# Patient Record
Sex: FEMALE | Race: WHITE | NOT HISPANIC OR LATINO | Employment: OTHER | URBAN - METROPOLITAN AREA
[De-identification: names, ages, dates, MRNs, and addresses within clinical notes are randomized per-mention and may not be internally consistent; named-entity substitution may affect disease eponyms.]

---

## 2017-03-01 NOTE — PATIENT DISCUSSION
AMD (DRY), OU  PRESCRIBE AREDS 2 VITAMINS / AMSLER GRID QD/ UV PROTECTION. SMOKING CESSATION EMPHASIZED. RETURN FOR FOLLOW-UP AS SCHEDULED.

## 2017-09-01 NOTE — PATIENT DISCUSSION
AMD (DRY), OU:  CONTINUE AREDS 2 VITAMINS / AMSLER GRID QD/ UV PROTECTION. SMOKING CESSATION EMPHASIZED. RETURN FOR FOLLOW-UP AS SCHEDULED.

## 2018-08-23 ENCOUNTER — ESTABLISHED COMPREHENSIVE EXAM (OUTPATIENT)
Dept: URBAN - METROPOLITAN AREA CLINIC 46 | Facility: CLINIC | Age: 48
End: 2018-08-23

## 2018-08-23 DIAGNOSIS — H25.9: ICD-10-CM

## 2018-08-23 DIAGNOSIS — H52.7: ICD-10-CM

## 2018-08-23 DIAGNOSIS — H18.51: ICD-10-CM

## 2018-08-23 PROCEDURE — 92015 DETERMINE REFRACTIVE STATE: CPT

## 2018-08-23 PROCEDURE — 92014 COMPRE OPH EXAM EST PT 1/>: CPT

## 2018-08-23 ASSESSMENT — VISUAL ACUITY
OD_SC: 20/40-2
OS_CC: 20/40+2
OS_CC: J3
OS_SC: 20/40
OS_SC: J8
OD_CC: J6
OD_CC: 20/30-2
OD_SC: J8

## 2018-08-23 ASSESSMENT — TONOMETRY
OS_IOP_MMHG: 13
OD_IOP_MMHG: 13

## 2018-10-05 NOTE — PATIENT DISCUSSION
EYELID PTOSIS, OU:  NO DIPLOPIA, ANISOCORIA OR FATIGUING SYMPTOMS. NOT VISUALLY SIGNIFICANT TO PATIENT.  WILL CONTINUE TO FOLLOW

## 2018-10-05 NOTE — PATIENT DISCUSSION
Eyelid Ptosis Counseling: I have discussed my exam findings and the nature of the diagnosis to the patient. There is no evidence of Magno syndrome, CN III palsy, eyelid malignancy, or myasthenia gravis. Treatment options include observation, taping the lids up, eyelid crutches attached to glasses, or surgical repair. The patient is instructed to return for follow-up as scheduled or sooner if any change in symptoms.

## 2019-01-21 ENCOUNTER — CORNEA CONSULT (OUTPATIENT)
Dept: URBAN - METROPOLITAN AREA CLINIC 43 | Facility: CLINIC | Age: 49
End: 2019-01-21

## 2019-01-21 VITALS
RESPIRATION RATE: 14 BRPM | HEIGHT: 55 IN | HEART RATE: 58 BPM | SYSTOLIC BLOOD PRESSURE: 129 MMHG | DIASTOLIC BLOOD PRESSURE: 79 MMHG

## 2019-01-21 DIAGNOSIS — H25.812: ICD-10-CM

## 2019-01-21 DIAGNOSIS — H18.51: ICD-10-CM

## 2019-01-21 DIAGNOSIS — H25.811: ICD-10-CM

## 2019-01-21 PROCEDURE — 92286 ANT SGM IMG I&R SPECLR MIC: CPT

## 2019-01-21 PROCEDURE — 92014 COMPRE OPH EXAM EST PT 1/>: CPT

## 2019-01-21 PROCEDURE — 92134 CPTRZ OPH DX IMG PST SGM RTA: CPT

## 2019-01-21 RX ORDER — PREDNISOLONE ACETATE 10 MG/ML: 1 SUSPENSION/ DROPS OPHTHALMIC

## 2019-01-21 RX ORDER — MOXIFLOXACIN HYDROCHLORIDE 5 MG/ML: 1 SOLUTION/ DROPS OPHTHALMIC

## 2019-01-21 RX ORDER — NEPAFENAC 3 MG/ML: 1 SUSPENSION/ DROPS OPHTHALMIC ONCE A DAY

## 2019-01-21 ASSESSMENT — VISUAL ACUITY
OD_CC: J6
OS_SC: 20/40-2
OS_CC: J5
OD_SC: J10
OS_CC: 20/40-2
OD_CC: 20/40-1
OS_SC: J12
OS_GLARE: 20/70
OD_SC: 20/50-1
OD_GLARE: 20/70

## 2019-01-21 ASSESSMENT — TONOMETRY
OD_IOP_MMHG: 13
OS_IOP_MMHG: 13

## 2019-03-29 NOTE — PATIENT DISCUSSION
AMD (DRY), ADVANCED OU:  CONTINUE PRESERVISION AREDS 2 VITAMINS / AMSLER GRID QD/ UV PROTECTION. SMOKING CESSATION EMPHASIZED. RETURN FOR FOLLOW-UP AS SCHEDULED.

## 2019-05-22 ENCOUNTER — SURGERY/PROCEDURE (OUTPATIENT)
Dept: URBAN - METROPOLITAN AREA CLINIC 43 | Facility: CLINIC | Age: 49
End: 2019-05-22

## 2019-05-22 ENCOUNTER — PRE-OP/H&P (OUTPATIENT)
Dept: URBAN - METROPOLITAN AREA SURGERY 14 | Facility: SURGERY | Age: 49
End: 2019-05-22

## 2019-05-22 DIAGNOSIS — H25.811: ICD-10-CM

## 2019-05-22 DIAGNOSIS — H18.51: ICD-10-CM

## 2019-05-22 DIAGNOSIS — H25.812: ICD-10-CM

## 2019-05-22 PROCEDURE — 65756 CORNEAL TRNSPL ENDOTHELIAL: CPT

## 2019-05-22 PROCEDURE — 99499 UNLISTED E&M SERVICE: CPT

## 2019-05-22 PROCEDURE — 66984 XCAPSL CTRC RMVL W/O ECP: CPT

## 2019-05-23 ENCOUNTER — 1 DAY POST-OP (OUTPATIENT)
Dept: URBAN - METROPOLITAN AREA CLINIC 46 | Facility: CLINIC | Age: 49
End: 2019-05-23

## 2019-05-23 DIAGNOSIS — Z96.1: ICD-10-CM

## 2019-05-23 DIAGNOSIS — Z98.890: ICD-10-CM

## 2019-05-23 PROCEDURE — 99024 POSTOP FOLLOW-UP VISIT: CPT

## 2019-05-23 ASSESSMENT — TONOMETRY: OD_IOP_MMHG: 18

## 2019-05-23 ASSESSMENT — VISUAL ACUITY: OD_SC: 20/200-1

## 2019-05-29 ENCOUNTER — POST-OP (OUTPATIENT)
Dept: URBAN - METROPOLITAN AREA CLINIC 46 | Facility: CLINIC | Age: 49
End: 2019-05-29

## 2019-05-29 DIAGNOSIS — Z96.1: ICD-10-CM

## 2019-05-29 DIAGNOSIS — Z98.890: ICD-10-CM

## 2019-05-29 PROCEDURE — 99024 POSTOP FOLLOW-UP VISIT: CPT

## 2019-05-29 ASSESSMENT — TONOMETRY: OD_IOP_MMHG: 10

## 2019-05-29 ASSESSMENT — VISUAL ACUITY
OS_SC: 20/40-2
OD_SC: 20/40-1

## 2019-06-03 ENCOUNTER — POST-OP (OUTPATIENT)
Dept: URBAN - METROPOLITAN AREA CLINIC 43 | Facility: CLINIC | Age: 49
End: 2019-06-03

## 2019-06-03 DIAGNOSIS — Z96.1: ICD-10-CM

## 2019-06-03 DIAGNOSIS — Z98.890: ICD-10-CM

## 2019-06-03 PROCEDURE — 99024 POSTOP FOLLOW-UP VISIT: CPT

## 2019-06-03 RX ORDER — NEPAFENAC 3 MG/ML: 1 SUSPENSION/ DROPS OPHTHALMIC ONCE A DAY

## 2019-06-03 RX ORDER — MOXIFLOXACIN HYDROCHLORIDE 5 MG/ML: 1 SOLUTION/ DROPS OPHTHALMIC

## 2019-06-03 RX ORDER — PREDNISOLONE ACETATE 10 MG/ML: 1 SUSPENSION/ DROPS OPHTHALMIC

## 2019-06-03 ASSESSMENT — TONOMETRY
OD_IOP_MMHG: 18
OS_IOP_MMHG: 19

## 2019-06-03 ASSESSMENT — VISUAL ACUITY
OS_SC: 20/40+2
OD_SC: 20/40+2

## 2019-07-08 ENCOUNTER — POST-OP (OUTPATIENT)
Dept: URBAN - METROPOLITAN AREA CLINIC 46 | Facility: CLINIC | Age: 49
End: 2019-07-08

## 2019-07-08 DIAGNOSIS — Z96.1: ICD-10-CM

## 2019-07-08 PROCEDURE — 99024 POSTOP FOLLOW-UP VISIT: CPT

## 2019-07-08 RX ORDER — FLUOROMETHOLONE 1 MG/ML: 1 SUSPENSION/ DROPS OPHTHALMIC

## 2019-07-08 RX ORDER — PREDNISOLONE ACETATE 10 MG/ML: 1 SUSPENSION/ DROPS OPHTHALMIC TWICE A DAY

## 2019-07-08 ASSESSMENT — TONOMETRY: OD_IOP_MMHG: 11

## 2019-07-08 ASSESSMENT — VISUAL ACUITY
OD_PH: 20/25
OS_SC: 20/40+1
OD_SC: 20/40-2

## 2019-08-14 ENCOUNTER — POST-OP (OUTPATIENT)
Dept: URBAN - METROPOLITAN AREA CLINIC 46 | Facility: CLINIC | Age: 49
End: 2019-08-14

## 2019-08-14 DIAGNOSIS — Z96.1: ICD-10-CM

## 2019-08-14 PROCEDURE — 99024 POSTOP FOLLOW-UP VISIT: CPT

## 2019-08-14 ASSESSMENT — VISUAL ACUITY
OS_SC: 20/30-2
OD_SC: 20/50
OD_PH: 20/40-2

## 2019-08-14 ASSESSMENT — TONOMETRY
OS_IOP_MMHG: 11
OD_IOP_MMHG: 12

## 2019-08-21 ENCOUNTER — PRE-OP/H&P (OUTPATIENT)
Dept: URBAN - METROPOLITAN AREA SURGERY 14 | Facility: SURGERY | Age: 49
End: 2019-08-21

## 2019-08-21 ENCOUNTER — SURGERY/PROCEDURE (OUTPATIENT)
Dept: URBAN - METROPOLITAN AREA CLINIC 43 | Facility: CLINIC | Age: 49
End: 2019-08-21

## 2019-08-21 DIAGNOSIS — H18.51: ICD-10-CM

## 2019-08-21 DIAGNOSIS — Z96.1: ICD-10-CM

## 2019-08-21 DIAGNOSIS — H25.812: ICD-10-CM

## 2019-08-21 PROCEDURE — 66984 XCAPSL CTRC RMVL W/O ECP: CPT

## 2019-08-21 PROCEDURE — 65756 CORNEAL TRNSPL ENDOTHELIAL: CPT

## 2019-08-21 PROCEDURE — 99499 UNLISTED E&M SERVICE: CPT

## 2019-08-21 PROCEDURE — 66500 INCISION OF IRIS: CPT

## 2019-08-22 ENCOUNTER — 1 DAY POST-OP (OUTPATIENT)
Dept: URBAN - METROPOLITAN AREA CLINIC 46 | Facility: CLINIC | Age: 49
End: 2019-08-22

## 2019-08-22 DIAGNOSIS — Z96.1: ICD-10-CM

## 2019-08-22 DIAGNOSIS — Z98.890: ICD-10-CM

## 2019-08-22 PROCEDURE — 99024 POSTOP FOLLOW-UP VISIT: CPT

## 2019-08-22 ASSESSMENT — TONOMETRY
OD_IOP_MMHG: 14
OS_IOP_MMHG: 16

## 2019-08-22 ASSESSMENT — VISUAL ACUITY
OS_SC: 20/200
OD_SC: 20/30-1

## 2019-08-26 ENCOUNTER — POST-OP CATARACT (OUTPATIENT)
Dept: URBAN - METROPOLITAN AREA CLINIC 46 | Facility: CLINIC | Age: 49
End: 2019-08-26

## 2019-08-26 DIAGNOSIS — Z96.1: ICD-10-CM

## 2019-08-26 DIAGNOSIS — Z98.890: ICD-10-CM

## 2019-08-26 PROCEDURE — 99024 POSTOP FOLLOW-UP VISIT: CPT

## 2019-08-26 ASSESSMENT — VISUAL ACUITY
OS_PH: 20/60-2
OD_SC: 20/30-1
OS_SC: 20/200

## 2019-08-26 ASSESSMENT — TONOMETRY
OS_IOP_MMHG: 20
OD_IOP_MMHG: 14

## 2019-08-30 ENCOUNTER — POST-OP CATARACT (OUTPATIENT)
Dept: URBAN - METROPOLITAN AREA CLINIC 46 | Facility: CLINIC | Age: 49
End: 2019-08-30

## 2019-08-30 DIAGNOSIS — Z96.1: ICD-10-CM

## 2019-08-30 PROCEDURE — 99024 POSTOP FOLLOW-UP VISIT: CPT

## 2019-08-30 RX ORDER — BRIMONIDINE TARTRATE, TIMOLOL MALEATE 2; 5 MG/ML; MG/ML: 1 SOLUTION/ DROPS OPHTHALMIC EVERY MORNING

## 2019-08-30 ASSESSMENT — VISUAL ACUITY
OD_SC: 20/30
OS_PH: 20/60+2
OS_SC: 20/400

## 2019-08-30 ASSESSMENT — TONOMETRY
OD_IOP_MMHG: 17
OS_IOP_MMHG: 22

## 2019-09-09 ENCOUNTER — POST-OP CATARACT (OUTPATIENT)
Dept: URBAN - METROPOLITAN AREA CLINIC 43 | Facility: CLINIC | Age: 49
End: 2019-09-09

## 2019-09-09 DIAGNOSIS — Z96.1: ICD-10-CM

## 2019-09-09 DIAGNOSIS — H18.51: ICD-10-CM

## 2019-09-09 PROCEDURE — 92025NC COMP. CORNEAL TOPO, UNI OR BILAT,

## 2019-09-09 PROCEDURE — 99024 POSTOP FOLLOW-UP VISIT: CPT

## 2019-09-09 ASSESSMENT — VISUAL ACUITY
OS_SC: 20/100-1
OD_SC: 20/30-1
OS_PH: 20/50-2

## 2019-09-09 ASSESSMENT — TONOMETRY
OD_IOP_MMHG: 10
OS_IOP_MMHG: 12

## 2019-09-11 ENCOUNTER — SURGERY/PROCEDURE (OUTPATIENT)
Dept: URBAN - METROPOLITAN AREA CLINIC 43 | Facility: CLINIC | Age: 49
End: 2019-09-11

## 2019-09-11 ENCOUNTER — PRE-OP/H&P (OUTPATIENT)
Dept: URBAN - METROPOLITAN AREA SURGERY 14 | Facility: SURGERY | Age: 49
End: 2019-09-11

## 2019-09-11 DIAGNOSIS — Z96.1: ICD-10-CM

## 2019-09-11 DIAGNOSIS — T85.398D: ICD-10-CM

## 2019-09-11 PROCEDURE — 66020 INJECTION TREATMENT OF EYE: CPT

## 2019-09-11 PROCEDURE — 66250 FOLLOW-UP SURGERY OF EYE: CPT

## 2019-09-11 PROCEDURE — 99499 UNLISTED E&M SERVICE: CPT

## 2019-09-12 ENCOUNTER — POST-OP (OUTPATIENT)
Dept: URBAN - METROPOLITAN AREA CLINIC 39 | Facility: CLINIC | Age: 49
End: 2019-09-12

## 2019-09-12 ENCOUNTER — POST-OP (OUTPATIENT)
Dept: URBAN - METROPOLITAN AREA CLINIC 46 | Facility: CLINIC | Age: 49
End: 2019-09-12

## 2019-09-12 DIAGNOSIS — Z98.890: ICD-10-CM

## 2019-09-12 DIAGNOSIS — Z96.1: ICD-10-CM

## 2019-09-12 DIAGNOSIS — T85.398D: ICD-10-CM

## 2019-09-12 PROCEDURE — 66999PO NON CO-MANAGED OTHER SURGERY PO

## 2019-09-12 PROCEDURE — 99024 POSTOP FOLLOW-UP VISIT: CPT

## 2019-09-12 ASSESSMENT — TONOMETRY
OS_IOP_MMHG: 41
OS_IOP_MMHG: 39
OS_IOP_MMHG: 37

## 2019-09-12 ASSESSMENT — VISUAL ACUITY: OS_SC: CF 1FT

## 2019-09-13 ENCOUNTER — POST-OP (OUTPATIENT)
Dept: URBAN - METROPOLITAN AREA CLINIC 46 | Facility: CLINIC | Age: 49
End: 2019-09-13

## 2019-09-13 DIAGNOSIS — T85.398D: ICD-10-CM

## 2019-09-13 DIAGNOSIS — Z98.890: ICD-10-CM

## 2019-09-13 PROCEDURE — 99024 POSTOP FOLLOW-UP VISIT: CPT

## 2019-09-13 RX ORDER — MOXIFLOXACIN OPHTHALMIC 5 MG/ML: 1 SOLUTION/ DROPS OPHTHALMIC

## 2019-09-13 ASSESSMENT — TONOMETRY: OS_IOP_MMHG: 13

## 2019-09-13 ASSESSMENT — VISUAL ACUITY: OD_SC: 20/25

## 2019-09-17 ENCOUNTER — POST-OP (OUTPATIENT)
Dept: URBAN - METROPOLITAN AREA CLINIC 39 | Facility: CLINIC | Age: 49
End: 2019-09-17

## 2019-09-17 DIAGNOSIS — T85.398D: ICD-10-CM

## 2019-09-17 DIAGNOSIS — Z98.890: ICD-10-CM

## 2019-09-17 PROCEDURE — 99024 POSTOP FOLLOW-UP VISIT: CPT

## 2019-09-17 ASSESSMENT — VISUAL ACUITY
OD_SC: 20/25-2
OS_SC: 20/30-2

## 2019-09-17 ASSESSMENT — TONOMETRY
OS_IOP_MMHG: 14
OD_IOP_MMHG: 12

## 2019-10-04 ENCOUNTER — POST-OP (OUTPATIENT)
Dept: URBAN - METROPOLITAN AREA CLINIC 46 | Facility: CLINIC | Age: 49
End: 2019-10-04

## 2019-10-04 DIAGNOSIS — T85.398D: ICD-10-CM

## 2019-10-04 DIAGNOSIS — Z98.890: ICD-10-CM

## 2019-10-04 PROCEDURE — 99024 POSTOP FOLLOW-UP VISIT: CPT

## 2019-10-04 ASSESSMENT — TONOMETRY
OD_IOP_MMHG: 13
OS_IOP_MMHG: 16

## 2019-10-04 ASSESSMENT — VISUAL ACUITY: OS_SC: 20/25-1

## 2020-05-11 ENCOUNTER — ESTABLISHED COMPREHENSIVE EXAM (OUTPATIENT)
Dept: URBAN - METROPOLITAN AREA CLINIC 46 | Facility: CLINIC | Age: 50
End: 2020-05-11

## 2020-05-11 DIAGNOSIS — T85.398D: ICD-10-CM

## 2020-05-11 DIAGNOSIS — Z96.1: ICD-10-CM

## 2020-05-11 DIAGNOSIS — Z98.890: ICD-10-CM

## 2020-05-11 PROCEDURE — 92015 DETERMINE REFRACTIVE STATE: CPT

## 2020-05-11 PROCEDURE — 92014 COMPRE OPH EXAM EST PT 1/>: CPT

## 2020-05-11 ASSESSMENT — TONOMETRY
OD_IOP_MMHG: 14
OS_IOP_MMHG: 16

## 2020-05-11 ASSESSMENT — VISUAL ACUITY
OS_SC: 20/25-1
OD_SC: 20/25+3
OD_CC: J1
OS_CC: J1
OU_SC: 20/20-1
OU_CC: J1

## 2020-10-29 ENCOUNTER — EST. PATIENT EMERGENCY (OUTPATIENT)
Dept: URBAN - METROPOLITAN AREA CLINIC 39 | Facility: CLINIC | Age: 50
End: 2020-10-29

## 2020-10-29 DIAGNOSIS — H04.123: ICD-10-CM

## 2020-10-29 PROCEDURE — 92012 INTRM OPH EXAM EST PATIENT: CPT

## 2020-10-29 ASSESSMENT — VISUAL ACUITY
OD_CC: 20/30-1
OU_CC: 20/20-1
OS_CC: 20/25

## 2020-10-29 ASSESSMENT — TONOMETRY
OD_IOP_MMHG: 14
OS_IOP_MMHG: 14

## 2021-01-18 ENCOUNTER — ESTABLISHED PATIENT (OUTPATIENT)
Dept: URBAN - METROPOLITAN AREA CLINIC 39 | Facility: CLINIC | Age: 51
End: 2021-01-18

## 2021-01-18 DIAGNOSIS — H00.13: ICD-10-CM

## 2021-01-18 DIAGNOSIS — H00.021: ICD-10-CM

## 2021-01-18 DIAGNOSIS — D23.112: ICD-10-CM

## 2021-01-18 DIAGNOSIS — D23.122: ICD-10-CM

## 2021-01-18 PROCEDURE — 99212 OFFICE O/P EST SF 10 MIN: CPT

## 2021-01-18 PROCEDURE — 92285 EXTERNAL OCULAR PHOTOGRAPHY: CPT

## 2021-01-18 ASSESSMENT — VISUAL ACUITY
OS_CC: 20/20
OD_CC: 20/20-1

## 2021-02-04 ENCOUNTER — FOLLOW UP (OUTPATIENT)
Dept: URBAN - METROPOLITAN AREA CLINIC 39 | Facility: CLINIC | Age: 51
End: 2021-02-04

## 2021-02-04 DIAGNOSIS — D23.122: ICD-10-CM

## 2021-02-04 DIAGNOSIS — D23.112: ICD-10-CM

## 2021-02-04 PROCEDURE — 99212 OFFICE O/P EST SF 10 MIN: CPT

## 2021-02-04 PROCEDURE — 67840 REMOVE EYELID LESION: CPT

## 2021-02-04 PROCEDURE — 92285 EXTERNAL OCULAR PHOTOGRAPHY: CPT

## 2021-02-04 ASSESSMENT — VISUAL ACUITY
OD_CC: 20/20-1
OS_CC: 20/20

## 2021-04-09 NOTE — PATIENT DISCUSSION
DRY EYE WITH INFLAMMATION:  PRESERVATIVE FREE ARTIFICIAL TEARS Q2 HOURS, LID HYGIENE. CONSIDER PUNCTAL PLUGS AND/OR RESTASIS AT FOLLOW. RETURN SOONER IF SYMPTOMS WORSEN.

## 2021-04-09 NOTE — PATIENT DISCUSSION
AMD (DRY), OU:  PRESCRIBE AREDS 2 VITAMINS / AMSLER GRID QD/ UV PROTECTION. SMOKING CESSATION EMPHASIZED. RETURN FOR FOLLOW-UP AS SCHEDULED.
COUNSELING:.
DIABETES WITHOUT RETINOPATHY: RETURN FOR FOLLOW-UP AS SCHEDULED FOR DILATED EXAM.
DRY EYE WITH INFLAMMATION:  PRESERVATIVE FREE ARTIFICIAL TEARS Q2 HOURS, LID HYGIENE. CONSIDER PUNCTAL PLUGS AND/OR RESTASIS AT FOLLOW. RETURN SOONER IF SYMPTOMS WORSEN.
Non Proliferative Diabetic  Counseling:  I have discussed with the patient the importance of controlling blood glucose, blood pressure and lipid levels to minimize the risk of progressing retinal complications from diabetes. I explained the importance of annual dilated eye exams because effective treatment of retinal complications depends on timely intervention. The patient was instructed to call or return sooner if they noticed blurred or distorted vision, fluctuating vision, pain or redness around one or both eyes. Return for follow-up as scheduled.
Breath sounds clear and equal bilaterally.

## 2021-06-09 ENCOUNTER — ESTABLISHED COMPREHENSIVE EXAM (OUTPATIENT)
Dept: URBAN - METROPOLITAN AREA CLINIC 39 | Facility: CLINIC | Age: 51
End: 2021-06-09

## 2021-06-09 DIAGNOSIS — H52.03: ICD-10-CM

## 2021-06-09 DIAGNOSIS — H26.493: ICD-10-CM

## 2021-06-09 DIAGNOSIS — H00.13: ICD-10-CM

## 2021-06-09 DIAGNOSIS — H52.201: ICD-10-CM

## 2021-06-09 DIAGNOSIS — H04.123: ICD-10-CM

## 2021-06-09 DIAGNOSIS — H52.4: ICD-10-CM

## 2021-06-09 DIAGNOSIS — H18.513: ICD-10-CM

## 2021-06-09 DIAGNOSIS — H43.812: ICD-10-CM

## 2021-06-09 PROCEDURE — 92015 DETERMINE REFRACTIVE STATE: CPT

## 2021-06-09 PROCEDURE — 92014 COMPRE OPH EXAM EST PT 1/>: CPT

## 2021-06-09 ASSESSMENT — VISUAL ACUITY
OD_SC: 20/30-1
OD_CC: 20/25+1
OD_SC: J10
OD_CC: J3
OS_CC: 20/20-1
OS_SC: J10
OS_CC: J1
OS_SC: 20/25

## 2021-06-09 ASSESSMENT — TONOMETRY
OD_IOP_MMHG: 13
OS_IOP_MMHG: 14

## 2022-01-13 NOTE — PATIENT DISCUSSION
[facility] monitoring, AREDS2 vitamins, no smoking, green leafy vegetables discussed.

## 2022-07-08 NOTE — PATIENT DISCUSSION
[facility] monitoring, AREDS2 vitamins, no smoking, green leafy vegetables discussed.

## 2024-07-10 ENCOUNTER — NEW PATIENT (OUTPATIENT)
Dept: URBAN - METROPOLITAN AREA CLINIC 39 | Facility: CLINIC | Age: 54
End: 2024-07-10

## 2024-07-10 DIAGNOSIS — H52.4: ICD-10-CM

## 2024-07-10 DIAGNOSIS — H52.03: ICD-10-CM

## 2024-07-10 DIAGNOSIS — H43.812: ICD-10-CM

## 2024-07-10 DIAGNOSIS — H04.123: ICD-10-CM

## 2024-07-10 DIAGNOSIS — H26.493: ICD-10-CM

## 2024-07-10 DIAGNOSIS — H52.201: ICD-10-CM

## 2024-07-10 DIAGNOSIS — H18.513: ICD-10-CM

## 2024-07-10 PROCEDURE — 92004 COMPRE OPH EXAM NEW PT 1/>: CPT

## 2024-07-10 PROCEDURE — 92015 DETERMINE REFRACTIVE STATE: CPT

## 2024-07-10 ASSESSMENT — VISUAL ACUITY
OS_CC: 20/20
OD_SC: 20/20-2
OD_SC: J6
OU_CC: 20/20
OS_SC: J6
OU_SC: J6
OS_CC: J1+
OS_SC: 20/20
OU_SC: 20/20
OD_CC: J1+
OD_CC: 20/20
OU_CC: J1+

## 2024-07-10 ASSESSMENT — TONOMETRY
OD_IOP_MMHG: 12
OS_IOP_MMHG: 11